# Patient Record
Sex: MALE | Race: WHITE | NOT HISPANIC OR LATINO | URBAN - METROPOLITAN AREA
[De-identification: names, ages, dates, MRNs, and addresses within clinical notes are randomized per-mention and may not be internally consistent; named-entity substitution may affect disease eponyms.]

---

## 2017-09-05 ENCOUNTER — EMERGENCY (EMERGENCY)
Facility: HOSPITAL | Age: 42
LOS: 1 days | Discharge: HOME | End: 2017-09-05
Attending: EMERGENCY MEDICINE
Payer: COMMERCIAL

## 2017-09-05 VITALS
OXYGEN SATURATION: 98 % | TEMPERATURE: 98 F | SYSTOLIC BLOOD PRESSURE: 110 MMHG | RESPIRATION RATE: 16 BRPM | DIASTOLIC BLOOD PRESSURE: 68 MMHG | HEART RATE: 88 BPM

## 2017-09-05 VITALS
HEART RATE: 100 BPM | RESPIRATION RATE: 16 BRPM | HEIGHT: 69 IN | SYSTOLIC BLOOD PRESSURE: 103 MMHG | DIASTOLIC BLOOD PRESSURE: 64 MMHG | TEMPERATURE: 98 F | WEIGHT: 164.91 LBS | OXYGEN SATURATION: 100 %

## 2017-09-05 DIAGNOSIS — R05 COUGH: ICD-10-CM

## 2017-09-05 DIAGNOSIS — R50.9 FEVER, UNSPECIFIED: ICD-10-CM

## 2017-09-05 PROCEDURE — 99283 EMERGENCY DEPT VISIT LOW MDM: CPT | Mod: 25

## 2017-09-05 PROCEDURE — 99284 EMERGENCY DEPT VISIT MOD MDM: CPT

## 2017-09-05 PROCEDURE — 71046 X-RAY EXAM CHEST 2 VIEWS: CPT

## 2017-09-05 PROCEDURE — 94640 AIRWAY INHALATION TREATMENT: CPT

## 2017-09-05 PROCEDURE — 71020: CPT | Mod: 26

## 2017-09-05 RX ORDER — IPRATROPIUM/ALBUTEROL SULFATE 18-103MCG
3 AEROSOL WITH ADAPTER (GRAM) INHALATION ONCE
Qty: 0 | Refills: 0 | Status: COMPLETED | OUTPATIENT
Start: 2017-09-05 | End: 2017-09-05

## 2017-09-05 RX ORDER — IBUPROFEN 200 MG
600 TABLET ORAL ONCE
Qty: 0 | Refills: 0 | Status: COMPLETED | OUTPATIENT
Start: 2017-09-05 | End: 2017-09-05

## 2017-09-05 RX ORDER — ALBUTEROL 90 UG/1
2 AEROSOL, METERED ORAL
Qty: 1 | Refills: 0 | OUTPATIENT
Start: 2017-09-05 | End: 2017-09-12

## 2017-09-05 RX ADMIN — Medication 600 MILLIGRAM(S): at 14:36

## 2017-09-05 RX ADMIN — Medication 600 MILLIGRAM(S): at 15:14

## 2017-09-05 RX ADMIN — Medication 3 MILLILITER(S): at 14:36

## 2017-09-05 NOTE — ED PROVIDER NOTE - OBJECTIVE STATEMENT
41 y/o male, no significant pmhx presents with improving intermittent cough x1 week and fever (103F oral) x3 days s/p traveling to [place], dx with pharyngitis, other travelers later dx with rotovirus. Pt confirms being in close quarters (bus) with multiple people who also had similar sx. Denies dysphagia, sore throat, headache, dizzy, lightheaded, nasal congestion, neck pain, joint pains, fatigue, N/V/D/C, night sweats, hemoptysis, cp/palpitations, SOB/dyspnea or weight loss. Confirms chills/sweating s/p taking IBU and "breaking the fever" and chest tightness after coughing. Pt received x2 shots (unknown), antibiotic (Claritromicina 500mg, currently taking), IBU 400mg and a cold medication. Pt has no hx of TB, no known positive TB contacts. 41 y/o male, no significant pmhx presents with improving intermittent cough x1 week and fever (103F oral) x3 days s/p traveling to [place], dx with pharyngitis, other travelers later dx with rotovirus. Pt confirms being in close quarters (bus) with multiple people who also had similar sx. Denies dysphagia, sore throat, headache, dizzy, lightheaded, nasal congestion, neck pain, joint pains, fatigue, N/V/D/C, night sweats, hemoptysis, cp/palpitations, SOB/dyspnea or weight loss. Confirms chills/sweating s/p taking IBU and "breaking the fever" and chest tightness after coughing. Pt received x2 shots (unknown), antibiotic (Claritromicina 500mg, currently taking), IBU 400mg and a cold medication.  No hemoptysis, unintentional weight loss.  No hx TB exposure, or travel to endemic areas.

## 2017-09-05 NOTE — ED ADULT NURSE NOTE - OBJECTIVE STATEMENT
presented to ed c/o cough congestion and breathing difficulty since Sunday patient got sick while in [place]

## 2017-09-05 NOTE — ED ADULT TRIAGE NOTE - CHIEF COMPLAINT QUOTE
C/o " I got sick in [place], fever 103. Dx with pharyngitis. I took medicine but Fever remains 101, with cough".

## 2017-09-05 NOTE — ED PROVIDER NOTE - MEDICAL DECISION MAKING DETAILS
43 y/o male, recent travel to [place], other travelers also sick with cough/fever, dx with pharyngitis on trip (given x2 unknown injections, abx, cough suppressant and ibu), vss afebrile in ED, pt took IBU 400mg 4 hours prior to arrival, low suspicion for pneumonia or tb, cxr negative; will give duoneb for cough and chest tightness and IBU. Plan to dc home to PMD (confirms good follow up 41 y/o male, recent travel to [place], other travelers also sick with cough/fever, dx with pharyngitis on trip (given x2 unknown injections, abx, cough suppressant and ibu), vss afebrile in ED, pt took IBU 400mg 4 hours prior to arrival, low suspicion for pneumonia or tb, cxr negative, bs clear pt well appearing, sx suggestive of viral etiology; will give duoneb for cough and chest tightness and IBU. Plan to dc home to PMD (confirms good follow up) for PPD and follow up. 43 y/o male, recent travel to [place], other travelers also sick with cough/fever, dx with pharyngitis on trip (given x2 unknown injections, abx, cough suppressant and ibu), vss afebrile in ED, pt took IBU 400mg 4 hours prior to arrival, low suspicion for pneumonia or tb, cxr negative, mild expiratory wheeze with fine RLL crackles well appearing, sx suggestive of viral etiology; will give duoneb for cough and chest tightness and IBU. Plan to dc home to PMD (confirms good follow up), continue abx with return precautions

## 2022-11-08 ENCOUNTER — NON-APPOINTMENT (OUTPATIENT)
Age: 47
End: 2022-11-08

## 2022-11-10 ENCOUNTER — APPOINTMENT (OUTPATIENT)
Dept: ORTHOPEDIC SURGERY | Facility: CLINIC | Age: 47
End: 2022-11-10

## 2022-11-10 ENCOUNTER — NON-APPOINTMENT (OUTPATIENT)
Age: 47
End: 2022-11-10

## 2022-11-10 VITALS — BODY MASS INDEX: 25.05 KG/M2 | HEIGHT: 70 IN | WEIGHT: 175 LBS

## 2022-11-10 DIAGNOSIS — M67.88 OTHER SPECIFIED DISORDERS OF SYNOVIUM AND TENDON, OTHER SITE: ICD-10-CM

## 2022-11-10 PROBLEM — Z00.00 ENCOUNTER FOR PREVENTIVE HEALTH EXAMINATION: Status: ACTIVE | Noted: 2022-11-10

## 2022-11-10 PROCEDURE — 99203 OFFICE O/P NEW LOW 30 MIN: CPT

## 2022-11-10 PROCEDURE — 73630 X-RAY EXAM OF FOOT: CPT | Mod: LT,RT

## 2022-11-10 NOTE — HISTORY OF PRESENT ILLNESS
[3] : a current pain level of 3/10 [4] : a maximum pain level of 4/10 [Walking] : worsened by walking [de-identified] : Patient is a new patient presented with bilateral posterior foot discomfort since May of this past year.  He states he had improved initially with some home exercises and was seen by a podiatrist with recommendation for home exercises.  He denies any significant swelling.  States doing some home exercises but only a few times a day

## 2022-11-10 NOTE — ASSESSMENT
[FreeTextEntry1] : Discussed with the patient exam history and imaging as well as treatment options at this time patient recommended for dedicated home exercises stretching and discussed need for stretching multiple times a day but this may take months to fully resolve.  Discussed surgery formal physical therapy the patient declined.  Patient to follow-up as needed.

## 2022-11-10 NOTE — PHYSICAL EXAM
[de-identified] : Bilateral ankle\par \par Constitutional: \par The patient is healthy-appearing and in no apparent distress. \par \par Gait and Station: \par The patient ambulates with a normal gait and no limp. \par \par Cardiovascular System: \par Ther capillary refill is less than 2 seconds. \par \par Skin: \par There are no skin abnormalities of ankle.\par \par Ankles and Feet: \par Inspection: \par There is no erythema.\par There is no induration.\par There is no warmth.\par There is no deformity.  \par There is no swelling. \par \par Bony Palpation: \par There is no tenderness of the calcaneal tuberosity.\par There is no tenderness of the metatarsals.\par There is no tenderness of the tarsometatarsal joints\par There is no tenderness of the navicular tuberosity. \par There is no tenderness of the dome of talus.\par There is no tenderness of the head of talus.\par There is no tenderness of the inferior tibiofibular joint.\par \par Soft Tissue Palpation: \par There is no tenderness of the tibialis posterior.\par There is no tenderness of the tibialis anterior. \par There is no tenderness of the plantar fascia.\par There is mild tenderness of the Achilles tendon.\par There is no tenderness of the extensor hallucis longus.\par There is no tenderness of the sinus tarsi. \par There is no tenderness of the peroneus longus and brevis.\par There is no tenderness of the deltoid ligament.   \par There is no tenderness of the anterior talofibular ligament and the calcaneofibular ligament. \par \par Active Range of Motion: \par The range of motion at the ankle is full. \par \par Stability: \par The anterior drawer is negative. \par \par Strength: \par There is 5/5 ankle plantarflexion and dorsiflexion.\par \par Neurological System: \par There is normal sensation to light touch at the ankle and foot. \par \par Psychiatric: \par The patient demonstrates a normal mood and affect and is active and alert. [de-identified] : Given patient's reported history and physical examination, x-ray evaluation ( as listed below ) was ordered and performed to aid in diagnosis and treatment of the patient.\par X-ray bilateral foot.  There is no significant bony / soft tissue abnormality, arthritis, or fracture.\par \par

## 2023-03-31 ENCOUNTER — EMERGENCY (EMERGENCY)
Facility: HOSPITAL | Age: 48
LOS: 1 days | Discharge: HOME | End: 2023-03-31
Attending: STUDENT IN AN ORGANIZED HEALTH CARE EDUCATION/TRAINING PROGRAM
Payer: COMMERCIAL

## 2023-03-31 VITALS
HEIGHT: 69 IN | HEART RATE: 69 BPM | DIASTOLIC BLOOD PRESSURE: 59 MMHG | OXYGEN SATURATION: 98 % | RESPIRATION RATE: 18 BRPM | TEMPERATURE: 98 F | WEIGHT: 175.05 LBS | SYSTOLIC BLOOD PRESSURE: 119 MMHG

## 2023-03-31 PROCEDURE — 99284 EMERGENCY DEPT VISIT MOD MDM: CPT | Mod: 25

## 2023-03-31 PROCEDURE — 73110 X-RAY EXAM OF WRIST: CPT

## 2023-03-31 PROCEDURE — 29130 APPL FINGER SPLINT STATIC: CPT | Mod: RT

## 2023-03-31 PROCEDURE — 29125 APPL SHORT ARM SPLINT STATIC: CPT | Mod: LT

## 2023-03-31 PROCEDURE — 73110 X-RAY EXAM OF WRIST: CPT | Mod: 26,RT

## 2023-03-31 PROCEDURE — 73090 X-RAY EXAM OF FOREARM: CPT

## 2023-03-31 PROCEDURE — 73090 X-RAY EXAM OF FOREARM: CPT | Mod: 26,LT

## 2023-03-31 PROCEDURE — 73130 X-RAY EXAM OF HAND: CPT

## 2023-03-31 PROCEDURE — 73130 X-RAY EXAM OF HAND: CPT | Mod: 26,RT

## 2023-03-31 NOTE — ED PROVIDER NOTE - PHYSICAL EXAMINATION
GENERAL: Well appearing, well nourished, awake, alert and in NAD  ENMT: Airway patent  EYES: conjunctiva clear  CARDIAC: Regular rate, regular rhythm.  Heart sounds S1, S2, no S3, S4. No murmurs, rubs or gallops.  RESPIRATORY: Breath sounds clear and equal in bilateral anterior lung fields, no wheezes/ronchi/stridor; pt breathing and speaking comfortably with no increased WOB, no accessory mm. use, no intercostal retractions, no nasal flaring  GI: abdomen soft, non-distended, non-tender, no rebound or guarding.  SKIN: warm and dry, no rashes  PSYCH: awake, alert, calm and cooperative   BUE: 5/5 motor strength bilat deltoid, biceps, triceps, wrist flexors/extensors, hand . sensation intact to light touch bilat C5-T1; 2+ radial pulses bilat; compartment soft, skin warm and dry   Right wrist: Positive snuff box tenderness to palpation, mild edema over palmar aspect the wrist, no deformity.  No laceration or ecchymosis.  Mild decreased range of motion of right wrist flexion secondary to pain  NEURO: pupils 3 mm, PERRL, EOMI (CN III, IV, VI), facial sensation intact to light touch in all 3 divisions bilat (CN V), face is symmetric with normal eye closure, eye opening, and smile (CN VII), hearing is normal to rubbing fingers (CN VII), palate elevates symmetrically, phonation is normal (CN IX, X),  shoulder shrug intact bilat (CN XI), tongue is midline with nl movements and no atrophy (CN XII),  speech is clear; 5/5 motor strength BUE and BLE: deltoids, biceps, triceps, wrist flexors/extensors, hand , hip flexors, knee flexors/extensors, plantar/dorsiflexors, hallux flexors/extensors; sensation intact to light touch BUE and BLE: C5-T1 and L3-S1 gait wnl   TMs with no hemotympanum, nl light reflex bilat   no midline c/t/l spinal ttp or stepoffs  head ncat no raccoon eyes/saleh sign

## 2023-03-31 NOTE — ED PROVIDER NOTE - OBJECTIVE STATEMENT
47-year-old male right-hand-dominant with no past medical or past surgical history, no home medications, after a mechanical fall from crouching with the right wrist pain.  Patient states he was cutting down amoxicillin and fell onto his right arm and now has pain in his right wrist.  Patient denies focal numbness or weakness.  Patient states he did hit his head when he felt, but did not lose consciousness.  Patient denies any preceding symptoms such as dizziness, chest pain, or shortness of breath.  Patient is not on any blood thinners.  Patient denies any associated nausea or vomiting.

## 2023-03-31 NOTE — ED PROVIDER NOTE - NS ED ROS FT
Positive: Right wrist pain, head trauma  Negative: Fever, chills, congestion, cough, rhinorrhea, chest pain, shortness of breath, abdominal pain, nausea, vomiting, diarrhea, dysuria, hematuria, leg edema, rash, focal numbness or weakness, loss of consciousness

## 2023-03-31 NOTE — ED PROVIDER NOTE - PATIENT PORTAL LINK FT
You can access the FollowMyHealth Patient Portal offered by [facility] by registering at the following website: [email or web address]. By joining [employer]’s FollowMyHealth portal, you will also be able to view your health information using other applications (apps) compatible with our system.

## 2023-03-31 NOTE — ED PROVIDER NOTE - NSFOLLOWUPINSTRUCTIONS_ED_ALL_ED_FT
1. You were seen for . A copy of any of your resulted labs, imaging, and findings have been provided to you. Make sure to view any test results that may not have yet resulted at the time of your discharge by creating a FollowMyHealth account at: [email or web address] to sign up for FollowMyHealth. Please review all of your lab, imaging, and all other results in their entirety with your primary care doctor.   2. Continue to take your home medications as prescribed.   3. Follow up with your primary care doctor within 48 hours to assess the symptoms you were seen for in the emergency department and to review all results from your visit. If you don't have a doctor, call [phone] to make an appointment.  4. Return immediately to the emergency department for new, persistent, or worsening symptoms or signs. Return immediately to the emergency department if you have chest pain, shortness of breath, loss of consciousness,  5. For your for health, you should make healthy food choices and be physically active. Also, you should not smoke or use drugs, and you should not drink alcohol in excess. Please visit [email or web address] for resources and more information. 1. You were seen for wrist pain. A copy of any of your resulted labs, imaging, and findings have been provided to you. Make sure to view any test results that may not have yet resulted at the time of your discharge by creating a FollowMyHealth account at: [email or web address] to sign up for FollowMyHealth. Please review all of your lab, imaging, and all other results in their entirety with your primary care doctor.   2. DO NOT BEAR WEIGHT ON YOUR RIGHT WRIST. Continue to take your home medications as prescribed. Take over the counter motrin 600 mg with food every six hours (do not exceed 3,200 mg in a 24 hour period) and supplement with tylenol 650 mg every six hours (do not exceed 4000 mg of tylenol in a 24 hour period and do not drink alcohol while taking tylenol) between the motrin dosages to have a layered effect. Consult a pharmacist or your primary care doctor with any questions.   3. Follow up with AN ORTHOPEDIC HAND SURGEON and your primary care doctor within 48 hours to assess the symptoms you were seen for in the emergency department and to review all results from your visit. If you don't have a doctor, call [phone] to make an appointment.  4. Return immediately to the emergency department for new, persistent, or worsening symptoms or signs. Return immediately to the emergency department if you have chest pain, shortness of breath, loss of consciousness, discoloration of your hand, or inability to move or feel your hand.   5. For your for health, you should make healthy food choices and be physically active. Also, you should not smoke or use drugs, and you should not drink alcohol in excess. Please visit [email or web address] for resources and more information.

## 2023-03-31 NOTE — ED PROVIDER NOTE - PROGRESS NOTE DETAILS
MD Jasson: On my review of patient's x-rays, I did not appreciate any acute fracture or dislocation.  However patient had persistent right hand snuffbox tenderness to palpation, therefore thumb spica was placed.  [place] would not allow me to place a wet read in the results section of the EMR, I therefore explained the patient that he needs to follow-up the final result of the x-ray by tomorrow and follow-up with a hand surgeon or orthopedic surgeon to continue care for possible occult or delayed presentation of scaphoid fracture and cannot bear weight on his right wrist until following up outpatient.  Will discharge

## 2023-03-31 NOTE — ED PROVIDER NOTE - CLINICAL SUMMARY MEDICAL DECISION MAKING FREE TEXT BOX
47-year-old male right-hand-dominant with no past medical or past surgical history, no home medications, after a mechanical fall from crouching with the right wrist pain.  On exam, vital signs are within normal limits, limited range of motion of right wrist secondary to pain, positive snuffbox tenderness on right wrist, bilateral upper extremities are neurovascularly intact.    DDx: Fracture versus dislocation versus sprain versus strain.  Patient needs [place] CT head criteria and it is negative, no need for CT head.    Plan:  -X-ray right wrist  -Percocet  -Reassess

## 2023-04-14 ENCOUNTER — APPOINTMENT (OUTPATIENT)
Dept: ORTHOPEDIC SURGERY | Facility: CLINIC | Age: 48
End: 2023-04-14
Payer: COMMERCIAL

## 2023-04-14 VITALS — WEIGHT: 177 LBS | HEIGHT: 70 IN | RESPIRATION RATE: 16 BRPM | BODY MASS INDEX: 25.34 KG/M2

## 2023-04-14 DIAGNOSIS — Z78.9 OTHER SPECIFIED HEALTH STATUS: ICD-10-CM

## 2023-04-14 DIAGNOSIS — Z82.61 FAMILY HISTORY OF ARTHRITIS: ICD-10-CM

## 2023-04-14 DIAGNOSIS — Z80.9 FAMILY HISTORY OF MALIGNANT NEOPLASM, UNSPECIFIED: ICD-10-CM

## 2023-04-14 DIAGNOSIS — S69.91XD UNSPECIFIED INJURY OF RIGHT WRIST, HAND AND FINGER(S), SUBSEQUENT ENCOUNTER: ICD-10-CM

## 2023-04-14 DIAGNOSIS — Z82.62 FAMILY HISTORY OF OSTEOPOROSIS: ICD-10-CM

## 2023-04-14 PROCEDURE — 73110 X-RAY EXAM OF WRIST: CPT | Mod: LT,RT

## 2023-04-14 PROCEDURE — 99203 OFFICE O/P NEW LOW 30 MIN: CPT

## 2023-05-18 NOTE — ED ADULT NURSE NOTE - NS PRO AD NO ADVANCE DIRECTIVE
route Can't walk greater than 200 feet. Expires in 5 years. , Disp: 1 each, Rfl: 0    FreeStyle Lancets MISC, USE TO TEST BLOOD SUGAR TWICE A DAY, Disp: 300 each, Rfl: 3    Lancet Device MISC, For use with lancets for blood glucose checks, Disp: 1 each, Rfl: 0    cyanocobalamin ([employer] VITAMIN B12) 1000 MCG tablet, Take 1 tablet by mouth daily, Disp: 30 tablet, Rfl: 0    Blood Glucose Monitoring Suppl (FREESTYLE LITE) SARY, use as directed, Disp: , Rfl: 0    nitroGLYCERIN (NITROSTAT) 0.4 MG SL tablet, Place 1 tablet under the tongue every 5 minutes as needed for Chest pain, Disp: 25 tablet, Rfl: 1    Biotin 300 MCG TABS, Take 600 mcg by mouth every other day , Disp: , Rfl:     Cholecalciferol (VITAMIN D3) 1000 UNITS TABS, Take 1 tablet by mouth daily, Disp: , Rfl:     ALLERGIES:   Allergies   Allergen Reactions    Brilinta [Ticagrelor]      Gi BLEED    Ampicillin Other (See Comments)    Clopidogrel Bisulfate Itching    Diovan [Valsartan] Other (See Comments)     cough    Lantus [Insulin Glargine] Nausea Only     Stomach \"quivered\" after taking it, palpitations    Metformin And Related Diarrhea     Chronic kidney disease stage 3       FAMILY HISTORY:       Problem Relation Age of Onset    Stroke Mother     Hypertension Mother     Heart Disease Father         AAA    Stroke Sister     Parkinsonism Brother     Cancer Sister         lung    Alcohol Abuse Sister          SOCIAL HISTORY:   Social History     Socioeconomic History    Marital status:       Spouse name: Not on file    Number of children: Not on file    Years of education: Not on file    Highest education level: Not on file   Occupational History    Not on file   Tobacco Use    Smoking status: Never    Smokeless tobacco: Never   Vaping Use    Vaping Use: Never used   Substance and Sexual Activity    Alcohol use: No    Drug use: No    Sexual activity: Not Currently     Partners: Male   Other Topics Concern    Not on file   Social History Narrative    Not on
No
